# Patient Record
Sex: MALE | Race: AMERICAN INDIAN OR ALASKA NATIVE
[De-identification: names, ages, dates, MRNs, and addresses within clinical notes are randomized per-mention and may not be internally consistent; named-entity substitution may affect disease eponyms.]

---

## 2018-12-29 ENCOUNTER — HOSPITAL ENCOUNTER (EMERGENCY)
Dept: HOSPITAL 5 - ED | Age: 33
Discharge: HOME | End: 2018-12-29
Payer: SELF-PAY

## 2018-12-29 VITALS — SYSTOLIC BLOOD PRESSURE: 111 MMHG | DIASTOLIC BLOOD PRESSURE: 76 MMHG

## 2018-12-29 DIAGNOSIS — J02.0: Primary | ICD-10-CM

## 2018-12-29 PROCEDURE — 99283 EMERGENCY DEPT VISIT LOW MDM: CPT

## 2018-12-29 PROCEDURE — 87430 STREP A AG IA: CPT

## 2018-12-29 NOTE — EMERGENCY DEPARTMENT REPORT
HPI





- General


Chief Complaint: Sore Throat


Time Seen by Provider: 12/29/18 09:05





- HPI


HPI: 





33-year-old  male presents to the emergency department with 

complaint of a few days of sore throat, fever, head congestion and what he says 

is flulike symptoms.  He has taken some Tylenol at home and some other 

over-the-counter medications without any relief.  No recent travel or sick 

contacts at home.  No asthma history.  No primary care physician.





ED Past Medical Hx





- Past Medical History


Previous Medical History?: No





- Surgical History


Past Surgical History?: No





- Social History


Smoking Status: Never Smoker





- Medications


Home Medications: 


                                Home Medications











 Medication  Instructions  Recorded  Confirmed  Last Taken  Type


 


RX: Amoxicillin [Trimox CAP] 500 mg PO Q8H #30 capsule 12/29/18  Unknown Rx














ED Review of Systems


ROS: 


Stated complaint: FLU SX


Other details as noted in HPI





Comment: All other systems reviewed and negative


Constitutional: chills, fever


Eyes: denies: eye pain, vision change


ENT: throat pain, congestion


Respiratory: denies: cough, shortness of breath


Cardiovascular: denies: chest pain, palpitations


Gastrointestinal: denies: abdominal pain, vomiting


Genitourinary: denies: dysuria, discharge


Musculoskeletal: myalgia.  denies: joint swelling


Skin: denies: rash, lesions


Neurological: denies: headache, weakness





Physical Exam





- Physical Exam


Vital Signs: 


                                   Vital Signs











  12/29/18 12/29/18





  04:13 04:28


 


Temperature 102.6 F H 


 


Pulse Rate 100 H 


 


Respiratory 20 20





Rate  


 


Blood Pressure 121/71 


 


O2 Sat by Pulse 97 





Oximetry  











Physical Exam: 





GENERAL: The patient is well-developed well-nourished.


HEENT: Normocephalic.  Atraumatic.   Patient has moist mucous membranes.  

Patient has bilateral tonsillar hypertrophy and erythema.


EYES:  Extraocular motions are intact.  Pupils are equal and reactive to light 

bilaterally.


NECK: Supple.  Trachea is midline.  Bilateral tender but mobile submandibular 

lymph nodes.


CHEST/LUNGS: Clear to auscultation.  There is no respiratory distress noted.  


HEART/CARDIOVASCULAR: Regular.  There is no tachycardia.  There is no obvious 

murmur.


ABDOMEN: Abdomen is soft, nontender.  Patient has normal bowel sounds.  There is

no abdominal distention.


SKIN: Skin is warm and dry.


NEURO: The patient is awake, alert, and oriented.  The patient is cooperative.  

The patient has no focal neurologic deficits.  The patient has normal speech.


MUSCULOSKELETAL: There is no tenderness or deformity.  There is no evidence of 

acute injury.





ED Course


                                   Vital Signs











  12/29/18 12/29/18





  04:13 04:28


 


Temperature 102.6 F H 


 


Pulse Rate 100 H 


 


Respiratory 20 20





Rate  


 


Blood Pressure 121/71 


 


O2 Sat by Pulse 97 





Oximetry  














ED Medical Decision Making





- Medical Decision Making


This patient presented with fever, sore throat, chills, body aches.  His throat 

shows tonsillar hypertrophy and significant erythema.  Positive for strep 

pharyngitis.  Patient will be placed on amoxicillin.  We discussed using Tylenol

and ibuprofen for fever and discomfort.  Fever resolved prior to discharge.








- Differential Diagnosis


strep pharyngitis, influenza, viral URI


Critical Care Time: No


Critical care attestation.: 


If time is entered above; I have spent that time in minutes in the direct care 

of this critically ill patient, excluding procedure time.








ED Disposition


Clinical Impression: 


 Strep pharyngitis





Disposition: DC-01 TO HOME OR SELFCARE


Is pt being admited?: No


Condition: Stable


Instructions:  Strep Throat (ED)


Additional Instructions: 


Please follow up with a primary care physician.  Return to the emergency 

Department with any worsening of your symptoms or any acute distress. You can 

take Tylenol every 4 hours and ibuprofen every 6 hours, using weight-based 

dosing on the back of the bottle, as needed for fever or discomfort.  Take the 

antibiotics as prescribed.


Prescriptions: 


RX: Amoxicillin [Trimox CAP] 500 mg PO Q8H #30 capsule


Referrals: 


PRIMARY MD [email or web address] [Primary Care Provider] - 3-5 Days


JONY ANDERSON MD [Staff Physician] - 3-5 Days


[facility] [Outside] - 3-5 Days


Time of Disposition: 09:23